# Patient Record
Sex: MALE | Race: WHITE | Employment: FULL TIME | URBAN - METROPOLITAN AREA
[De-identification: names, ages, dates, MRNs, and addresses within clinical notes are randomized per-mention and may not be internally consistent; named-entity substitution may affect disease eponyms.]

---

## 2020-05-29 ENCOUNTER — APPOINTMENT (OUTPATIENT)
Dept: GENERAL RADIOLOGY | Facility: CLINIC | Age: 35
End: 2020-05-29
Attending: EMERGENCY MEDICINE
Payer: COMMERCIAL

## 2020-05-29 ENCOUNTER — HOSPITAL ENCOUNTER (EMERGENCY)
Facility: CLINIC | Age: 35
Discharge: HOME | End: 2020-05-29
Attending: EMERGENCY MEDICINE | Admitting: EMERGENCY MEDICINE
Payer: COMMERCIAL

## 2020-05-29 VITALS
DIASTOLIC BLOOD PRESSURE: 80 MMHG | HEART RATE: 59 BPM | RESPIRATION RATE: 11 BRPM | OXYGEN SATURATION: 98 % | TEMPERATURE: 97.8 F | SYSTOLIC BLOOD PRESSURE: 112 MMHG

## 2020-05-29 DIAGNOSIS — R07.9 CHEST PAIN, UNSPECIFIED TYPE: ICD-10-CM

## 2020-05-29 LAB
ANION GAP SERPL CALCULATED.3IONS-SCNC: 3 MMOL/L (ref 3–14)
BASOPHILS # BLD AUTO: 0.1 10E9/L (ref 0–0.2)
BASOPHILS NFR BLD AUTO: 1 %
BUN SERPL-MCNC: 13 MG/DL (ref 7–30)
CALCIUM SERPL-MCNC: 8.9 MG/DL (ref 8.5–10.1)
CHLORIDE SERPL-SCNC: 106 MMOL/L (ref 94–109)
CO2 SERPL-SCNC: 29 MMOL/L (ref 20–32)
CREAT SERPL-MCNC: 0.99 MG/DL (ref 0.66–1.25)
D DIMER PPP FEU-MCNC: <0.3 UG/ML FEU (ref 0–0.5)
DIFFERENTIAL METHOD BLD: NORMAL
EOSINOPHIL # BLD AUTO: 0.1 10E9/L (ref 0–0.7)
EOSINOPHIL NFR BLD AUTO: 2.4 %
ERYTHROCYTE [DISTWIDTH] IN BLOOD BY AUTOMATED COUNT: 11.1 % (ref 10–15)
GFR SERPL CREATININE-BSD FRML MDRD: >90 ML/MIN/{1.73_M2}
GLUCOSE SERPL-MCNC: 91 MG/DL (ref 70–99)
HCT VFR BLD AUTO: 45.3 % (ref 40–53)
HGB BLD-MCNC: 15.3 G/DL (ref 13.3–17.7)
IMM GRANULOCYTES # BLD: 0 10E9/L (ref 0–0.4)
IMM GRANULOCYTES NFR BLD: 0.2 %
INTERPRETATION ECG - MUSE: NORMAL
LYMPHOCYTES # BLD AUTO: 1.8 10E9/L (ref 0.8–5.3)
LYMPHOCYTES NFR BLD AUTO: 36.2 %
MCH RBC QN AUTO: 29.5 PG (ref 26.5–33)
MCHC RBC AUTO-ENTMCNC: 33.8 G/DL (ref 31.5–36.5)
MCV RBC AUTO: 88 FL (ref 78–100)
MONOCYTES # BLD AUTO: 0.5 10E9/L (ref 0–1.3)
MONOCYTES NFR BLD AUTO: 9.1 %
NEUTROPHILS # BLD AUTO: 2.6 10E9/L (ref 1.6–8.3)
NEUTROPHILS NFR BLD AUTO: 51.1 %
NRBC # BLD AUTO: 0 10*3/UL
NRBC BLD AUTO-RTO: 0 /100
PLATELET # BLD AUTO: 209 10E9/L (ref 150–450)
POTASSIUM SERPL-SCNC: 3.9 MMOL/L (ref 3.4–5.3)
RBC # BLD AUTO: 5.18 10E12/L (ref 4.4–5.9)
SODIUM SERPL-SCNC: 138 MMOL/L (ref 133–144)
TROPONIN I SERPL-MCNC: <0.015 UG/L (ref 0–0.04)
WBC # BLD AUTO: 5.1 10E9/L (ref 4–11)

## 2020-05-29 PROCEDURE — 25000128 H RX IP 250 OP 636: Performed by: EMERGENCY MEDICINE

## 2020-05-29 PROCEDURE — 85379 FIBRIN DEGRADATION QUANT: CPT | Performed by: EMERGENCY MEDICINE

## 2020-05-29 PROCEDURE — 93005 ELECTROCARDIOGRAM TRACING: CPT

## 2020-05-29 PROCEDURE — 71046 X-RAY EXAM CHEST 2 VIEWS: CPT

## 2020-05-29 PROCEDURE — 80048 BASIC METABOLIC PNL TOTAL CA: CPT | Performed by: EMERGENCY MEDICINE

## 2020-05-29 PROCEDURE — 96374 THER/PROPH/DIAG INJ IV PUSH: CPT

## 2020-05-29 PROCEDURE — 85025 COMPLETE CBC W/AUTO DIFF WBC: CPT | Performed by: EMERGENCY MEDICINE

## 2020-05-29 PROCEDURE — 99285 EMERGENCY DEPT VISIT HI MDM: CPT | Mod: 25

## 2020-05-29 PROCEDURE — 84484 ASSAY OF TROPONIN QUANT: CPT | Performed by: EMERGENCY MEDICINE

## 2020-05-29 RX ORDER — IBUPROFEN 200 MG
600 TABLET ORAL EVERY 8 HOURS PRN
Qty: 30 TABLET | Refills: 0 | Status: SHIPPED | OUTPATIENT
Start: 2020-05-29

## 2020-05-29 RX ORDER — KETOROLAC TROMETHAMINE 15 MG/ML
15 INJECTION, SOLUTION INTRAMUSCULAR; INTRAVENOUS ONCE
Status: COMPLETED | OUTPATIENT
Start: 2020-05-29 | End: 2020-05-29

## 2020-05-29 RX ORDER — ASPIRIN 81 MG/1
324 TABLET, CHEWABLE ORAL ONCE
Status: DISCONTINUED | OUTPATIENT
Start: 2020-05-29 | End: 2020-05-29 | Stop reason: HOSPADM

## 2020-05-29 RX ADMIN — KETOROLAC TROMETHAMINE 15 MG: 15 INJECTION, SOLUTION INTRAMUSCULAR; INTRAVENOUS at 08:07

## 2020-05-29 ASSESSMENT — ENCOUNTER SYMPTOMS
CHILLS: 0
MYALGIAS: 1
COUGH: 0
ARTHRALGIAS: 1
SHORTNESS OF BREATH: 0
FEVER: 0

## 2020-05-29 NOTE — ED TRIAGE NOTES
Pt arrives to ED with left sided pain, did house work last night. Took ASA and ibuprofen seemed to help. A/ox 4, ABC's intact.

## 2020-05-29 NOTE — ED AVS SNAPSHOT
[facility] Emergency Department  [address]  [facility] [address]  Phone:  [phone]  Fax:  [phone]                                    Scott King   MRN: [id number]    Department:  [facility] Emergency Department   Date of Visit:  5/29/2020           After Visit Summary Signature Page    I have received my discharge instructions, and my questions have been answered. I have discussed any challenges I see with this plan with the nurse or doctor.    ..........................................................................................................................................  Patient/Patient Representative Signature      ..........................................................................................................................................  Patient Representative Print Name and Relationship to Patient    ..................................................               ................................................  Date                                   Time    ..........................................................................................................................................  Reviewed by Signature/Title    ...................................................              ..............................................  Date                                               Time          22EPIC Rev 08/18

## 2020-05-29 NOTE — ED PROVIDER NOTES
History     Chief Complaint:  Chest Pain      The history is provided by the patient.      Scott King is a 34 year old male with a history of chest pain who presents with shoulder and chest pain that began this morning. The patient states he woke up with intense pain that began under his left shoulder blade and wrapped around to under his left pectoral. He woke up his wife and asked her to get him pain meds as he had trouble standing upright, and took 1 ibuprofen and a baby aspirin, which brought the pain level down to a 3 or 4. The patient also tried to have his wife massage the area but it was too painful/tender. The patient notes that his chest pain feels identical to an episode of rib pain he had over a year ago, for which he was evaluated and underwent a stress test, but doesn't remember the location.He believes it is related to his running habits. He typically runs 3 miles a day, and last ran yesterday. Here, the patient states his pain is exacerbated with movement and taking a deep breath. He denies any right-sided pain, fever, chills, cough, or shortness of breath. He further denies any personal or family history of high BP, diabetes, elevated cholesterol, leg or lung blood clots, or MI in any family members before age 60. The patient does not smoke, has had no recent surgery, and hasn't had any recent travel or been sitting for a long period of time. No known COVID exposure. The patient has been working from home and lives with wife who is on maternity leave.      Allergies:  No Known Drug Allergies    Medications:    The patient is not currently taking any prescribed medications.    Past Medical History:    Paresthesia  Chest pain    Past Surgical History:    Moffit teeth extraction    Family History:    Father: Hyperlipidemia    Social History:  The patient was not accompanied to the ED.  Smoking Status: Never smoker  Smokeless Tobacco: Never used  Alcohol Use: Yes  Marital Status:   Single    Review of Systems   Constitutional: Negative for chills and fever.   Respiratory: Negative for cough and shortness of breath.    Cardiovascular: Positive for chest pain (L side).   Musculoskeletal: Positive for arthralgias (L shoulder) and myalgias (L shoulder into chest, denies R sided pain).   All other systems reviewed and are negative.    Physical Exam     Patient Vitals for the past 24 hrs:   BP Temp Temp src Pulse Heart Rate Resp SpO2   05/29/20 0845 112/80 -- -- 59 -- -- 98 %   05/29/20 0800 120/77 -- -- 72 71 12 100 %   05/29/20 0752 (!) 142/82 -- -- 79 76 14 100 %   05/29/20 0735 (!) 140/98 97.8  F (36.6  C) Oral -- 73 18 100 %       Physical Exam    GEN: alert and pleasant mood    HEAD: atraumatic    EYES: pupils reactive, extraocular muscles intact, conjunctivae normal    ENT: TMs normal as are EACs; nares patent; normal posterior pharynx and oral mucosa    NECK: no posterior midline tenderness, no meningeal signs, trachea midline     RESPIRATORY: no tachypnea. Splints with deep breathing.    CVS: normal S1/S2, no murmurs/rubs/gallops    ABDOMEN: soft, nontender, no masses or organomegaly, no rebound, positive bowel sounds    MUSCULOSKELETAL: Goes to sit up feels left-sided pain in chest and into the back. Tenderness over left side inferior intercostal muscles, the 8th, 9th, 10th intercostal spaces in midclavicular line.    SKIN: warm and dry, no acute rashes or ulceration, no erythema     NEURO: GCS 15, cranial nerves intact.  Motor and sensory- good tone; normal gait and coordination    LYMPH: no lymphadenopathy    Emergency Department Course     ECG:  Indication: Chest pain  Completed at 0811.  Read at 0811.   Normal sinus rhythm  Normal ECG   Rate 71 bpm. GA interval 176. QRS duration 98. QT/QTc 386/419. P-R-T axes 76 79 53.  Agree with computer interpretation.    Imaging:  Radiology findings were communicated with the patient who voiced understanding of the findings.    XR Chest 2 Views:    Negative chest. Lungs clear.  Report per radiology.    Laboratory:  Laboratory findings were communicated with the patient who voiced understanding of the findings.    CBC: WBC 5.1, HGB 15.3,   BMP: AWNL (Creatinine 0.99)    D dimer: <0.3  (0801) Troponin I: <0.015     Procedures  None.    Interventions:  0807 Toradol 15 mg IV    Emergency Department Course:  Past medical records, nursing notes, and vitals reviewed.    0747 I performed an exam of the patient as documented above.     EKG obtained in the ED, see results above.     IV was inserted and blood was drawn for laboratory testing, results above.    The patient was sent for a chest X-ray while in the emergency department, results above.     0914 I rechecked the patient and discussed the results of his workup thus far.     Findings and plan explained to the Patient. Patient discharged home with instructions regarding supportive care, medications, and reasons to return. The importance of close follow-up was reviewed. The patient was prescribed Advil.    I personally reviewed the laboratory and imaging results with the Patient and answered all related questions prior to discharge.     Impression & Plan     Medical Decision Making:  Scott King is a 34 year old male who presents with pleuritic type chest pain and pain worse with movement. This appears to be musculoskeletal. Patient is PERC rule negative. I also considered cardiac ischemia but there is a normal nondiagnostic EKG, normal troponin. This does not sound to be cardiac in nature with the movement-related aggravation of the pain as well as the deep breathing aggravation. He has had a normal stress echocardiogram done almost two years ago now. In addition, his heart score is 0. I am recommending antiinflammatory analgesics. Activity as tolerated, follow-up closely with PCP on Monday, and I gave him one since there is none on record. If the nature or character of his symptoms changes or he  develops new symptoms in the interim, return to the ED.     Diagnosis:    ICD-10-CM    1. Chest pain, unspecified type  R07.9        Disposition:  Discharged to home.    Discharge Medications:  New Prescriptions    IBUPROFEN (ADVIL/MOTRIN) 200 MG TABLET    Take 3 tablets (600 mg) by mouth every 8 hours as needed for mild pain       Scribe Disclosure:  I, Julita Sheikh, am serving as a scribe at 7:47 AM on 5/29/2020 to document services personally performed by Emeka Nieto MD based on my observations and the provider's statements to me.     Julita Sheikh  5/29/2020   [facility] EMERGENCY DEPARTMENT       Emeka Nieto MD  05/29/20 [id number]

## 2020-11-29 ENCOUNTER — HEALTH MAINTENANCE LETTER (OUTPATIENT)
Age: 35
End: 2020-11-29

## 2021-09-25 ENCOUNTER — HEALTH MAINTENANCE LETTER (OUTPATIENT)
Age: 36
End: 2021-09-25

## 2022-01-09 ENCOUNTER — HEALTH MAINTENANCE LETTER (OUTPATIENT)
Age: 37
End: 2022-01-09

## 2022-12-26 ENCOUNTER — HEALTH MAINTENANCE LETTER (OUTPATIENT)
Age: 37
End: 2022-12-26

## 2023-04-16 ENCOUNTER — HEALTH MAINTENANCE LETTER (OUTPATIENT)
Age: 38
End: 2023-04-16

## 2023-08-20 ENCOUNTER — NURSE TRIAGE (OUTPATIENT)
Dept: NURSING | Facility: CLINIC | Age: 38
End: 2023-08-20
Payer: COMMERCIAL

## 2023-08-21 NOTE — TELEPHONE ENCOUNTER
Nurse Triage SBAR    Situation: Needle stick    Background: Patient calling. Needle stick happened about 1 hour ago.     Assessment: Patient stepped on an old needle. No blood noted. Went into the callous of his foot. Needle went into his foot.     Protocol Recommended Disposition: See Physician within 4 hours (Or PCP Triage)    Recommendation: According to the protocol, Patient should be seen within 4 hours (Or PCP Triage). Advised Patient that the patient needs to be seen within 4 hours (Or PCP Triage). Care advice given. Patient verbalizes understanding and agrees with plan of care. Reviewed concerning symptoms and when to call back.     Holly Randall, RN Nursing Advisor 8/20/2023 7:36 PM     Reason for Disposition   [1] Caused by a needlestick or other sharp object AND [2] possible exposure to another person's body fluids   [1] SOURCE person is UNKNOWN (e.g., needle in garbage) AND [2] needlestick within past 72 hours    Additional Information   Negative: [1] Puncture wound of head, neck, chest, back, or abdomen AND [2] sounds life-threatening to the triager   Negative: Shock suspected (e.g., cold/pale/clammy skin, too weak to stand, low BP, rapid pulse)   Negative: Sounds like a life-threatening emergency to the triager   Negative: [1] SOURCE person is HIV POSITIVE AND [2] needlestick within past 72 hours   Negative: [1] SOURCE person is HEPATITIS B POSITIVE AND [2] needlestick within past 7 days AND [3] EXPOSED person NOT fully immunized against Hepatitis B (or does not know)   Negative: [1] Puncture is on the head, neck, chest, abdomen or overlying a joint AND [2] could be deep   Negative: Sensation of something still in the wound (i.e., needle broke off)   Negative: Skin is split open or gaping (or length > 1/2 inch or 12 mm)   Negative: Epinephrine (such as from Epi-Pen) injected into hand, finger, foot, or toe (Exception: If previously discharged injector, continue triage)   Negative: Epinephrine injected  into safe site (not into hand, finger, foot, or toe) (Exception: previously discharged injector)   Negative: [1] SOURCE person is HIGHER RISK (e.g., [facility] inmate, injection drug user) AND [2] needlestick within past 72 hours   Negative: Patient sounds very sick or weak to the triager    Protocols used: Puncture Wound-A-AH, [id number]

## 2024-06-23 ENCOUNTER — HEALTH MAINTENANCE LETTER (OUTPATIENT)
Age: 39
End: 2024-06-23